# Patient Record
(demographics unavailable — no encounter records)

---

## 2025-04-03 NOTE — HISTORY OF PRESENT ILLNESS
[Patient reported mammogram was normal] : Patient reported mammogram was normal [Patient reported PAP Smear was normal] : Patient reported PAP Smear was normal [Patient reported bone density results were abnormal] : Patient reported bone density results were abnormal [Patient reported colonoscopy was normal] : Patient reported colonoscopy was normal [LMP unknown] : LMP unknown [Y] : Positive pregnancy history [Mammogramdate] : 2024 [PapSmeardate] : 2023 [TextBox_31] : All normal [BoneDensityDate] : 2024 [TextBox_37] : osteopenia  [ColonoscopyDate] : 2022 [PGHxTotal] : 3 [[facility]] : 3 [[facility] Center[facility]] : 3 [FreeTextEntry1] :  x 3

## 2025-04-03 NOTE — DISCUSSION/SUMMARY
[FreeTextEntry1] : 61-year-old para 3 here for annual GYN visit  Healthcare maintenance: Pap HPV collected.  Did mammogram bone density with Dr. Canales, will get results from [facility], patient states did in December.  Up-to-date with colonoscopy  Follow-up 1 year

## 2025-04-03 NOTE — PHYSICAL EXAM
[TextEntry] : General appearance well-appearing no acute distress  Breast examined in the upright and supine position.  Breast exam within normal limits, no masses no lymphadenopathy nontender bilaterally  Normal external genitalia  Speculum inserted normal-appearing vagina no lesions no abnormal discharge cervix appears closed no lesions. Pap collected  Bimanual exam performed. Anteverted uterus nontender no cervical motion tenderness no adnexal masses bilaterally, no adnexal tenderness bilaterally  rectal exam guiac neg